# Patient Record
Sex: FEMALE | Employment: OTHER | URBAN - METROPOLITAN AREA
[De-identification: names, ages, dates, MRNs, and addresses within clinical notes are randomized per-mention and may not be internally consistent; named-entity substitution may affect disease eponyms.]

---

## 2023-03-01 ENCOUNTER — TELEPHONE (OUTPATIENT)
Dept: GERIATRICS | Facility: CLINIC | Age: 78
End: 2023-03-01
Payer: MEDICARE

## 2023-03-01 NOTE — TELEPHONE ENCOUNTER
[facility] Geriatrics Triage Nurse Telephone Encounter    Provider: BLANCA Valenzuela CNP   Facility: [facility] Facility Type:  TCU    Caller: Amber  Call Back Number: [phone]    Allergies:  Not on File     Reason for call: Nurse is reporting that patient hasn't voided during the day shift.  PVR was 158cc.  She says she when she tries to void that there is urine coming out, but nothing is actually coming out.  No abdominal pain or distention.      Verbal Order/Direction given by Provider: PVR Q 8 hours.  Straight cath for PVR >300cc or if patient c/o abdominal pain or distention.  Update on 3/2/23 if patient continue to have issues with urinary retention.      Provider giving Order:  BLANCA Valenzuela CNP     Verbal Order given to: Tom Marmolejo RN

## 2023-03-02 ENCOUNTER — DOCUMENTATION ONLY (OUTPATIENT)
Dept: GERIATRICS | Facility: CLINIC | Age: 78
End: 2023-03-02
Payer: MEDICARE

## 2023-03-02 ENCOUNTER — LAB REQUISITION (OUTPATIENT)
Dept: LAB | Facility: CLINIC | Age: 78
End: 2023-03-02

## 2023-03-02 ENCOUNTER — TELEPHONE (OUTPATIENT)
Dept: GERIATRICS | Facility: CLINIC | Age: 78
End: 2023-03-02
Payer: MEDICARE

## 2023-03-02 DIAGNOSIS — N17.9 ACUTE KIDNEY FAILURE, UNSPECIFIED (H): ICD-10-CM

## 2023-03-02 PROBLEM — I89.0 LYMPHEDEMA: Status: ACTIVE | Noted: 2021-04-06

## 2023-03-02 PROBLEM — E87.5 HYPERKALEMIA: Status: ACTIVE | Noted: 2022-09-01

## 2023-03-02 PROBLEM — N30.01 ACUTE CYSTITIS WITH HEMATURIA: Status: ACTIVE | Noted: 2023-02-12

## 2023-03-02 PROBLEM — A41.9 SEPSIS (H): Status: ACTIVE | Noted: 2023-02-21

## 2023-03-02 PROBLEM — R60.0 EDEMA OF BOTH LOWER EXTREMITIES: Status: ACTIVE | Noted: 2021-04-06

## 2023-03-02 PROBLEM — Z87.19 HISTORY OF GASTROINTESTINAL HEMORRHAGE: Status: ACTIVE | Noted: 2021-04-05

## 2023-03-02 PROBLEM — Z87.19 HISTORY OF SMALL BOWEL OBSTRUCTION: Status: ACTIVE | Noted: 2021-04-05

## 2023-03-02 PROBLEM — M75.51 BURSITIS OF RIGHT SHOULDER: Status: ACTIVE | Noted: 2020-09-11

## 2023-03-02 PROBLEM — S42.411D CLOSED SUPRACONDYLAR FRACTURE OF RIGHT HUMERUS WITH ROUTINE HEALING: Status: ACTIVE | Noted: 2022-02-21

## 2023-03-02 LAB
ANION GAP SERPL CALCULATED.3IONS-SCNC: 22 MMOL/L (ref 7–15)
BUN SERPL-MCNC: 70.6 MG/DL (ref 8–23)
CALCIUM SERPL-MCNC: 7 MG/DL (ref 8.8–10.2)
CHLORIDE SERPL-SCNC: 86 MMOL/L (ref 98–107)
CREAT SERPL-MCNC: 3.53 MG/DL (ref 0.51–0.95)
DEPRECATED HCO3 PLAS-SCNC: 20 MMOL/L (ref 22–29)
GFR SERPL CREATININE-BSD FRML MDRD: 13 ML/MIN/1.73M2
GLUCOSE SERPL-MCNC: 74 MG/DL (ref 70–99)
POTASSIUM SERPL-SCNC: 4.1 MMOL/L (ref 3.4–5.3)
SODIUM SERPL-SCNC: 128 MMOL/L (ref 136–145)

## 2023-03-02 PROCEDURE — 80048 BASIC METABOLIC PNL TOTAL CA: CPT | Performed by: FAMILY MEDICINE

## 2023-03-02 NOTE — TELEPHONE ENCOUNTER
[facility] Triage Nurse Telephone Encounter    Provider: BLANCA Valenzuela CNP   Facility: [facility] Facility Type:  TCU    Caller: Amber  Call Back Number: [phone]    Allergies:  Not on File     Reason for call: Nurse is calling to report BMP results.  BP's were in the 90's systolic earlier, but then came up to the low 100's.  Patient does endorse cramps in hands and feet.  Staff has been encouraging fluids.        Verbal Order/Direction given by Provider: Call and fax labs to nephrology.  Obtain a straight cath UA/UC.  Draw a Heme 1, CMP, Mg, TSH, B12, and pre-albumin on 3/3/23.      Provider giving Order:  BLANCA Valenzuela CNP     Verbal Order given to: Kimberly Marmolejo RN

## 2023-03-02 NOTE — PROGRESS NOTES
Nurse is calling to report patient with minimal bladder scan and minimal urine output, some muscle cramping and low blood pressures, recheck was 91/55.  Per hospital notes that she was recently hospitalized for urosepsis and completed antibiotics and Jung was removed.  She has only been in the TCU for about 24 hours.  Blood pressures in the hospital were in the low 100s over 70s however she did have poor p.o. intake per their I's a nd O's.  At this time she is otherwise stable and alert and oriented to her baseline, gave orders to check blood pressure every 4 hours, push fluids and call back for any confusion or any systolic blood pressure less than 90s.  Update primary in the morning.  Sherin Rebolledo, CNP

## 2023-03-03 ENCOUNTER — LAB REQUISITION (OUTPATIENT)
Dept: LAB | Facility: CLINIC | Age: 78
End: 2023-03-03

## 2023-03-03 ENCOUNTER — TRANSITIONAL CARE UNIT VISIT (OUTPATIENT)
Dept: GERIATRICS | Facility: CLINIC | Age: 78
End: 2023-03-03
Payer: MEDICARE

## 2023-03-03 VITALS
TEMPERATURE: 97.1 F | RESPIRATION RATE: 17 BRPM | HEART RATE: 101 BPM | DIASTOLIC BLOOD PRESSURE: 61 MMHG | SYSTOLIC BLOOD PRESSURE: 95 MMHG | OXYGEN SATURATION: 95 %

## 2023-03-03 DIAGNOSIS — Z71.89 ACP (ADVANCE CARE PLANNING): ICD-10-CM

## 2023-03-03 DIAGNOSIS — N30.01 ACUTE CYSTITIS WITH HEMATURIA: ICD-10-CM

## 2023-03-03 DIAGNOSIS — N17.9 ACUTE KIDNEY INJURY (H): ICD-10-CM

## 2023-03-03 DIAGNOSIS — K50.90 CROHN'S DISEASE WITHOUT COMPLICATION, UNSPECIFIED GASTROINTESTINAL TRACT LOCATION (H): ICD-10-CM

## 2023-03-03 DIAGNOSIS — G82.20 PARAPLEGIA (H): ICD-10-CM

## 2023-03-03 DIAGNOSIS — A41.9 BACTERIAL SEPSIS (H): Primary | ICD-10-CM

## 2023-03-03 DIAGNOSIS — N18.30 CHRONIC KIDNEY DISEASE, STAGE 3 UNSPECIFIED (H): ICD-10-CM

## 2023-03-03 DIAGNOSIS — Z93.2 ILEOSTOMY STATUS (H): ICD-10-CM

## 2023-03-03 DIAGNOSIS — N18.5 CKD (CHRONIC KIDNEY DISEASE) STAGE 5, GFR LESS THAN 15 ML/MIN (H): ICD-10-CM

## 2023-03-03 LAB
ALBUMIN SERPL BCG-MCNC: 3.4 G/DL (ref 3.5–5.2)
ALP SERPL-CCNC: 133 U/L (ref 35–104)
ALT SERPL W P-5'-P-CCNC: 16 U/L (ref 10–35)
ANION GAP SERPL CALCULATED.3IONS-SCNC: 24 MMOL/L (ref 7–15)
AST SERPL W P-5'-P-CCNC: 23 U/L (ref 10–35)
BASOPHILS # BLD AUTO: 0 10E3/UL (ref 0–0.2)
BASOPHILS NFR BLD AUTO: 1 %
BILIRUB SERPL-MCNC: 0.5 MG/DL
BUN SERPL-MCNC: 74.1 MG/DL (ref 8–23)
CALCIUM SERPL-MCNC: 6.6 MG/DL (ref 8.8–10.2)
CHLORIDE SERPL-SCNC: 83 MMOL/L (ref 98–107)
CREAT SERPL-MCNC: 3.62 MG/DL (ref 0.51–0.95)
DEPRECATED HCO3 PLAS-SCNC: 18 MMOL/L (ref 22–29)
EOSINOPHIL # BLD AUTO: 0 10E3/UL (ref 0–0.7)
EOSINOPHIL NFR BLD AUTO: 1 %
ERYTHROCYTE [DISTWIDTH] IN BLOOD BY AUTOMATED COUNT: 16.2 % (ref 10–15)
GFR SERPL CREATININE-BSD FRML MDRD: 12 ML/MIN/1.73M2
GLUCOSE SERPL-MCNC: 70 MG/DL (ref 70–99)
HCT VFR BLD AUTO: 27.4 % (ref 35–47)
HGB BLD-MCNC: 8.9 G/DL (ref 11.7–15.7)
IMM GRANULOCYTES # BLD: 0 10E3/UL
IMM GRANULOCYTES NFR BLD: 2 %
LYMPHOCYTES # BLD AUTO: 0.3 10E3/UL (ref 0.8–5.3)
LYMPHOCYTES NFR BLD AUTO: 12 %
MAGNESIUM SERPL-MCNC: 1.3 MG/DL (ref 1.7–2.3)
MCH RBC QN AUTO: 34.4 PG (ref 26.5–33)
MCHC RBC AUTO-ENTMCNC: 32.5 G/DL (ref 31.5–36.5)
MCV RBC AUTO: 106 FL (ref 78–100)
MONOCYTES # BLD AUTO: 0.5 10E3/UL (ref 0–1.3)
MONOCYTES NFR BLD AUTO: 18 %
NEUTROPHILS # BLD AUTO: 1.8 10E3/UL (ref 1.6–8.3)
NEUTROPHILS NFR BLD AUTO: 66 %
NRBC # BLD AUTO: 0 10E3/UL
NRBC BLD AUTO-RTO: 0 /100
PLATELET # BLD AUTO: 257 10E3/UL (ref 150–450)
POTASSIUM SERPL-SCNC: 4.1 MMOL/L (ref 3.4–5.3)
PREALB SERPL IA-MCNC: 29 MG/DL (ref 15–45)
PROT SERPL-MCNC: 5.6 G/DL (ref 6.4–8.3)
RBC # BLD AUTO: 2.59 10E6/UL (ref 3.8–5.2)
SODIUM SERPL-SCNC: 125 MMOL/L (ref 136–145)
TSH SERPL DL<=0.005 MIU/L-ACNC: 3.39 UIU/ML (ref 0.3–4.2)
VIT B12 SERPL-MCNC: >4000 PG/ML (ref 232–1245)
WBC # BLD AUTO: 2.7 10E3/UL (ref 4–11)

## 2023-03-03 PROCEDURE — 80053 COMPREHEN METABOLIC PANEL: CPT | Performed by: NURSE PRACTITIONER

## 2023-03-03 PROCEDURE — 84443 ASSAY THYROID STIM HORMONE: CPT | Performed by: NURSE PRACTITIONER

## 2023-03-03 PROCEDURE — 83735 ASSAY OF MAGNESIUM: CPT | Performed by: NURSE PRACTITIONER

## 2023-03-03 PROCEDURE — 84134 ASSAY OF PREALBUMIN: CPT | Performed by: NURSE PRACTITIONER

## 2023-03-03 PROCEDURE — 82607 VITAMIN B-12: CPT | Performed by: NURSE PRACTITIONER

## 2023-03-03 PROCEDURE — 99309 SBSQ NF CARE MODERATE MDM 30: CPT | Performed by: NURSE PRACTITIONER

## 2023-03-03 PROCEDURE — 85025 COMPLETE CBC W/AUTO DIFF WBC: CPT | Performed by: NURSE PRACTITIONER

## 2023-03-03 PROCEDURE — 99497 ADVNCD CARE PLAN 30 MIN: CPT | Performed by: NURSE PRACTITIONER

## 2023-03-03 RX ORDER — LEVOTHYROXINE SODIUM 50 UG/1
50 TABLET ORAL
COMMUNITY
Start: 2022-11-29

## 2023-03-03 RX ORDER — DICYCLOMINE HCL 20 MG
20 TABLET ORAL 3 TIMES DAILY
COMMUNITY
Start: 2022-04-20

## 2023-03-03 RX ORDER — ONDANSETRON 4 MG/1
4 TABLET, ORALLY DISINTEGRATING ORAL
COMMUNITY
Start: 2023-02-28

## 2023-03-03 RX ORDER — CALCIUM CARBONATE/VITAMIN D3 500 MG-10
1 TABLET,CHEWABLE ORAL EVERY MORNING
COMMUNITY
Start: 2023-02-28

## 2023-03-03 RX ORDER — MECLIZINE HYDROCHLORIDE 25 MG/1
25 TABLET ORAL
COMMUNITY
Start: 2022-08-02

## 2023-03-03 RX ORDER — MAGNESIUM OXIDE 400 MG/1
400 TABLET ORAL 2 TIMES DAILY
COMMUNITY
Start: 2023-02-28

## 2023-03-03 RX ORDER — CYANOCOBALAMIN 1000 UG/ML
1000 INJECTION, SOLUTION INTRAMUSCULAR; SUBCUTANEOUS EVERY MORNING
COMMUNITY
Start: 2022-10-19 | End: 2023-03-07

## 2023-03-03 RX ORDER — FAMOTIDINE 20 MG/1
20 TABLET, FILM COATED ORAL 2 TIMES DAILY
COMMUNITY
Start: 2022-06-01

## 2023-03-03 RX ORDER — ACETAMINOPHEN 500 MG
500 TABLET ORAL
COMMUNITY
Start: 2022-02-24

## 2023-03-03 RX ORDER — MULTIPLE VITAMINS W/ MINERALS TAB 9MG-400MCG
1 TAB ORAL
COMMUNITY

## 2023-03-03 NOTE — PROGRESS NOTES
[facility] GERIATRICS    PRIMARY CARE PROVIDER AND CLINIC:  Physician No Ref-Primary, No address on file  Chief Complaint   Patient presents with     Hospital F/U      [facility] Medical Record Number:  [id number]  Place of Service where encounter took place:  [facility]  AT [facility] ([facility]) [[id number]]    Rosalinda Schneider  is a 77 year old  (1945), admitted to the above facility from  [facility] . Hospital stay 02/21/2023 through 02/28/2023..   HPI:    Rosalinda Schneider is a 77 y.o. female with a history of paraplegia, ankylosing spondylitis-wheelchair-bound, Crohn's disease status post ileostomy and CKD admitted with AUGUSTINA on chronic kidney disease. Had previously been diagnosed with a Klebsiella UTI and was antibiotics. On admission, met sepsis criteria however repeat urine culture was negative, antibiotics Dc'd.  History of waxing and waning creatinine and presented with creatinine levels up to 6.7. Trended back down with IV fluids. Had been seen by nephrology during previous hospital stay. Referral placed for outpatient nephrology follow-up.  Also had urinary retention requiring Jung catheter placement. Jung catheter was removed prior to discharge.   1. Acute on chronic kidney injury ( ckd stage 3):   Patient has had waxing and waning creatinine.  Mostly has hovered between 1.8-3.  Presented with initial creatinine is 6.76.  Has trended down to patrick at 2.6.  2/27/23 up to 3.1.  Acute injury secondary to sepsis with ongoing infection, dehydration.   2. Sepsis  3. Ongoing urinary tract infection  - History of Klebsiella UTI during last admission.  -Was on Cipro on admission  -Switched to IV Rocephin.  - urine culture with no growth  - Stopped IV Rocephin  Other medical problems :   Paraplegia (HC) (12/23/2006)   Ankylosing spondylitis (HC) (5/9/2008)  - Wheelchair bound , mostly due to neck injury in 2002.    CROHN'S DISEASE (5/9/2008)   Status post ileostomy (HC) (10/19/2016)    Assessment: On imuran.  Ostomy placed due to perforated bowel. Functioning appropriately.  Hyponatremia (12/29/2012)    Assessment: Chronic Na ~130.    Patient seen today for follow up, appears healthy, good historian, no distress, intermittent hand/foot cramps, voiding well after thacker removed on 2/28, I&O appropriate, VS WNL, UA on 3/2 appears clean, labs on 3/3 in Epic, Na 125, Creat 3.62, Ca 6.6, GFR 12, Mg 1.3, TSH 3.39, B12 >4000, W 2.7, H 8.9, based on lab results is asymptomatic for any overt issues.    Advance Care Planning Goals of Care Discussion 3/3/2023  Goals of care discussed with Rosalinda Schneider on 3/3. Present at discussion: patient. Questions discussed and patient response:  What is your understanding now of where you are with your illness?: very good. How much information about what is likely to be ahead with your illness would you like to have?: everything. As the clinician I communicated the following to the patient regarding their prognosis: good. If your health situation worsens, what are your most important goals?: returning home. What are your biggest fears and worries about the future with your health?: kidney function. Unacceptable function : NA. What abilities are so critical to your life that you cannot imagine living without them?: returning home. Pt does NOT want to: die. If you become sicker, how much are you willing to go through for the possibility of gaining more time?: willing. Would this change if these were permanent states, if they did not get better?: maybe. How much does your agent and/or family know about your priorities and wishes?: most everything. Added by BLANCA Stern CNP              CODE STATUS/ADVANCE DIRECTIVES DISCUSSION:  No Order  CPR/Full code   ALLERGIES:   Allergies   Allergen Reactions     Ibuprofen Other (See Comments)     Other reaction(s): Renal Failure  All anti-inflamitory medications.  All anti-inflamitory medications.  All anti-inflamitory medications.  All  anti-inflamitory medications.  All anti-inflamitory medications.  All anti-inflamitory medications.  All anti-inflamitory medications.       Omeprazole Other (See Comments) and Swelling     Other reaction(s): Edema  Leg Swelling  Leg Swelling  Leg Swelling  Leg Swelling  Leg Swelling  Leg Swelling  Leg Swelling       Aspirin Itching     Other reaction(s): Itching  Other reaction(s): Itching       Naproxen      ALL ANTI-INFLAMMATORY MEDICATIONS  KIDNEY FAILURE  ALL ANTI-INFLAMMATORY MEDICATIONS  KIDNEY FAILURE       Rofecoxib Other (See Comments)     VIOXX- Renal function  problems  VIOXX- Renal function  problems       Sulfamethoxazole-Trimethoprim Other (See Comments)     Leg and hand cramps  Leg and hand cramps       Ciprofloxacin Rash     Other reaction(s): Unknown  Other reaction(s): Rash, Rash  Other reaction(s): Rash, Rash  Per patient only       Nsaids Unknown and Rash     Other reaction(s): *Unknown - Pt Doesn't Remember       Penicillins Dizziness and Rash     Other reaction(s): Rash  Other reaction(s): Rash  Per patient only        PAST MEDICAL HISTORY:   Past Medical History:   Diagnosis Date     Anemia      Chronic kidney disease      Crohn's disease (H)      Dyslipidemia      Gastroesophageal reflux disease with esophagitis      Small bowel obstruction (H)       PAST SURGICAL HISTORY:   has a past surgical history that includes Total Hip Arthroplasty; carpal tunnel release rt/lt; Colon surgery; joint replacement; REVISE TOTAL HIP REPLACEMENT; and cystoscopy.  FAMILY HISTORY: family history includes Cancer in her father.  SOCIAL HISTORY:   reports that she has never smoked. She has never been exposed to tobacco smoke. She has never used smokeless tobacco. She reports that she does not drink alcohol and does not use drugs.  Patient's living condition: lives with spouse    Post Discharge Medication Reconciliation Status:   MED REC REQUIRED  Post Medication Reconciliation Status: discharge medications  reconciled, continue medications without change       Current Outpatient Medications   Medication Sig     acetaminophen (TYLENOL) 500 MG tablet Take 500 mg by mouth     Ascorbic Acid 500 MG CHEW Take 500 mg by mouth every morning     Calcium Carb-Cholecalciferol 500-10 MG-MCG CHEW Take 1 tablet by mouth every morning     cholecalciferol 25 MCG (1000 UT) CHEW Take 1,000 Units by mouth     cyanocobalamin (CYANOCOBALAMIN) 1000 MCG/ML injection Inject 1,000 mcg into the muscle every morning     dicyclomine (BENTYL) 20 MG tablet Take 20 mg by mouth 3 times daily     famotidine (PEPCID) 20 MG tablet Take 20 mg by mouth 2 times daily     levothyroxine (SYNTHROID/LEVOTHROID) 50 MCG tablet Take 50 mcg by mouth     magnesium oxide (MAG-OX) 400 MG tablet Take 400 mg by mouth     meclizine (ANTIVERT) 25 MG tablet Take 25 mg by mouth     multivitamin w/minerals (THERA-VIT-M) tablet Take 1 tablet by mouth     ondansetron (ZOFRAN ODT) 4 MG ODT tab Place 4 mg under the tongue     No current facility-administered medications for this visit.       ROS:  4 point ROS including Respiratory, CV, GI and , other than that noted in the HPI,  is negative    Vitals:  BP 95/61   Pulse 101   Temp 97.1  F (36.2  C)   Resp 17   SpO2 95%   Exam:  GENERAL APPEARANCE:  in no distress, appears healthy  ENT:  Mouth and posterior oropharynx normal, moist mucous membranes  RESP:  lungs clear to auscultation   CV:  peripheral edema 1+ in lower legs, rate-normal  ABDOMEN:  bowel sounds normal  M/S:   Gait and station abnormal WC mobility  SKIN:  Inspection of skin and subcutaneous tissue baseline  NEURO:   Examination of sensation by touch normal  PSYCH:  affect and mood normal    Lab/Diagnostic data:  Labs done in SNF are in [facility] EPIC. Please refer to them using EPIC/Care Everywhere.    ASSESSMENT/PLAN:    (A41.9) Bacterial sepsis (H)  (primary encounter diagnosis)  (N30.01) Acute cystitis with hematuria  Comment: thought to have UTI in  hospital, completed ABX, UA on 3/2 clean, afebrile  Plan:   -PT/OT eval and treat  -dietary eval  -clarify B12 next injection 4/1/23  -UC pending results  -CBC, CMP, Mg, TSH, pre-alb and B12 completed, in [facility]  -plan to review lab results and make recommendations      (N17.9) Acute kidney injury (H)  (N18.5) CKD (chronic kidney disease) stage 5, GFR less than 15 ml/min (H)  Comment: creat high as 6.76 on 2/21, today 3.62  Plan: labs 3/3 in [facility]  -staff to fax labs to nephrology  -dose medications renally as possible      (G82.20) Paraplegia (H)  Comment: chronic, fall 2002, mild use LE's, moderate use UE's  Plan: follow up neurology PRN  -has EWC for mobility    (K50.90) Crohn's disease without complication, unspecified gastrointestinal tract location (H)  (Z93.2) Ileostomy status (H)  Comment: crohns 2008, iliostomy 2016  Plan: continue imuran BID  -follow up GI PRN    (Z71.89) ACP (advance care planning)  Comment: code status confirmed, full code  Plan: CT ADVANCE CARE PLANNING FIRST 30 MINS          I spent 17 minutes F2F with patient in addition to todays visit completing a POLST form.        Electronically signed by:  BLANCA Stern CNP

## 2023-03-03 NOTE — LETTER
3/3/2023        RE: Rosalinda Schneider  [address]  [place] [address]        [facility] GERIATRICS    PRIMARY CARE PROVIDER AND CLINIC:  Physician No Ref-Primary, No address on file  Chief Complaint   Patient presents with     Hospital F/U      [facility] Medical Record Number:  [id number]  Place of Service where encounter took place:  [facility]  AT [facility] ([facility]) [[id number]]    Rosalinda Schneider  is a 77 year old  (1945), admitted to the above facility from  [facility] . Hospital stay 02/21/2023 through 02/28/2023..   HPI:    Rosalinda Schneider is a 77 y.o. female with a history of paraplegia, ankylosing spondylitis-wheelchair-bound, Crohn's disease status post ileostomy and CKD admitted with AUGUSTINA on chronic kidney disease. Had previously been diagnosed with a Klebsiella UTI and was antibiotics. On admission, met sepsis criteria however repeat urine culture was negative, antibiotics Dc'd.  History of waxing and waning creatinine and presented with creatinine levels up to 6.7. Trended back down with IV fluids. Had been seen by nephrology during previous hospital stay. Referral placed for outpatient nephrology follow-up.  Also had urinary retention requiring Jung catheter placement. Jung catheter was removed prior to discharge.   1. Acute on chronic kidney injury ( ckd stage 3):   Patient has had waxing and waning creatinine.  Mostly has hovered between 1.8-3.  Presented with initial creatinine is 6.76.  Has trended down to patrick at 2.6.  2/27/23 up to 3.1.  Acute injury secondary to sepsis with ongoing infection, dehydration.   2. Sepsis  3. Ongoing urinary tract infection  - History of Klebsiella UTI during last admission.  -Was on Cipro on admission  -Switched to IV Rocephin.  - urine culture with no growth  - Stopped IV Rocephin  Other medical problems :   Paraplegia (HC) (12/23/2006)   Ankylosing spondylitis (HC) (5/9/2008)  - Wheelchair bound , mostly due to neck injury in 2002.    CROHN'S  DISEASE (5/9/2008)   Status post ileostomy (HC) (10/19/2016)    Assessment: On imuran. Ostomy placed due to perforated bowel. Functioning appropriately.  Hyponatremia (12/29/2012)    Assessment: Chronic Na ~130.    Patient seen today for follow up, appears healthy, good historian, no distress, intermittent hand/foot cramps, voiding well after thacker removed on 2/28, I&O appropriate, VS WNL, UA on 3/2 appears clean, labs on 3/3 in Epic, Na 125, Creat 3.62, Ca 6.6, GFR 12, Mg 1.3, TSH 3.39, B12 >4000, W 2.7, H 8.9, based on lab results is asymptomatic for any overt issues.    Advance Care Planning Goals of Care Discussion 3/3/2023  Goals of care discussed with Rosalinda Schneider on 3/3. Present at discussion: patient. Questions discussed and patient response:  What is your understanding now of where you are with your illness?: very good. How much information about what is likely to be ahead with your illness would you like to have?: everything. As the clinician I communicated the following to the patient regarding their prognosis: good. If your health situation worsens, what are your most important goals?: returning home. What are your biggest fears and worries about the future with your health?: kidney function. Unacceptable function : NA. What abilities are so critical to your life that you cannot imagine living without them?: returning home. Pt does NOT want to: die. If you become sicker, how much are you willing to go through for the possibility of gaining more time?: willing. Would this change if these were permanent states, if they did not get better?: maybe. How much does your agent and/or family know about your priorities and wishes?: most everything. Added by BLANCA Stern CNP              CODE STATUS/ADVANCE DIRECTIVES DISCUSSION:  No Order  CPR/Full code   ALLERGIES:   Allergies   Allergen Reactions     Ibuprofen Other (See Comments)     Other reaction(s): Renal Failure  All anti-inflamitory  medications.  All anti-inflamitory medications.  All anti-inflamitory medications.  All anti-inflamitory medications.  All anti-inflamitory medications.  All anti-inflamitory medications.  All anti-inflamitory medications.       Omeprazole Other (See Comments) and Swelling     Other reaction(s): Edema  Leg Swelling  Leg Swelling  Leg Swelling  Leg Swelling  Leg Swelling  Leg Swelling  Leg Swelling       Aspirin Itching     Other reaction(s): Itching  Other reaction(s): Itching       Naproxen      ALL ANTI-INFLAMMATORY MEDICATIONS  KIDNEY FAILURE  ALL ANTI-INFLAMMATORY MEDICATIONS  KIDNEY FAILURE       Rofecoxib Other (See Comments)     VIOXX- Renal function  problems  VIOXX- Renal function  problems       Sulfamethoxazole-Trimethoprim Other (See Comments)     Leg and hand cramps  Leg and hand cramps       Ciprofloxacin Rash     Other reaction(s): Unknown  Other reaction(s): Rash, Rash  Other reaction(s): Rash, Rash  Per patient only       Nsaids Unknown and Rash     Other reaction(s): *Unknown - Pt Doesn't Remember       Penicillins Dizziness and Rash     Other reaction(s): Rash  Other reaction(s): Rash  Per patient only        PAST MEDICAL HISTORY:   Past Medical History:   Diagnosis Date     Anemia      Chronic kidney disease      Crohn's disease (H)      Dyslipidemia      Gastroesophageal reflux disease with esophagitis      Small bowel obstruction (H)       PAST SURGICAL HISTORY:   has a past surgical history that includes Total Hip Arthroplasty; carpal tunnel release rt/lt; Colon surgery; joint replacement; REVISE TOTAL HIP REPLACEMENT; and cystoscopy.  FAMILY HISTORY: family history includes Cancer in her father.  SOCIAL HISTORY:   reports that she has never smoked. She has never been exposed to tobacco smoke. She has never used smokeless tobacco. She reports that she does not drink alcohol and does not use drugs.  Patient's living condition: lives with spouse    Post Discharge Medication Reconciliation  Status:   MED REC REQUIRED  Post Medication Reconciliation Status: discharge medications reconciled, continue medications without change       Current Outpatient Medications   Medication Sig     acetaminophen (TYLENOL) 500 MG tablet Take 500 mg by mouth     Ascorbic Acid 500 MG CHEW Take 500 mg by mouth every morning     Calcium Carb-Cholecalciferol 500-10 MG-MCG CHEW Take 1 tablet by mouth every morning     cholecalciferol 25 MCG (1000 UT) CHEW Take 1,000 Units by mouth     cyanocobalamin (CYANOCOBALAMIN) 1000 MCG/ML injection Inject 1,000 mcg into the muscle every morning     dicyclomine (BENTYL) 20 MG tablet Take 20 mg by mouth 3 times daily     famotidine (PEPCID) 20 MG tablet Take 20 mg by mouth 2 times daily     levothyroxine (SYNTHROID/LEVOTHROID) 50 MCG tablet Take 50 mcg by mouth     magnesium oxide (MAG-OX) 400 MG tablet Take 400 mg by mouth     meclizine (ANTIVERT) 25 MG tablet Take 25 mg by mouth     multivitamin w/minerals (THERA-VIT-M) tablet Take 1 tablet by mouth     ondansetron (ZOFRAN ODT) 4 MG ODT tab Place 4 mg under the tongue     No current facility-administered medications for this visit.       ROS:  4 point ROS including Respiratory, CV, GI and , other than that noted in the HPI,  is negative    Vitals:  BP 95/61   Pulse 101   Temp 97.1  F (36.2  C)   Resp 17   SpO2 95%   Exam:  GENERAL APPEARANCE:  in no distress, appears healthy  ENT:  Mouth and posterior oropharynx normal, moist mucous membranes  RESP:  lungs clear to auscultation   CV:  peripheral edema 1+ in lower legs, rate-normal  ABDOMEN:  bowel sounds normal  M/S:   Gait and station abnormal WC mobility  SKIN:  Inspection of skin and subcutaneous tissue baseline  NEURO:   Examination of sensation by touch normal  PSYCH:  affect and mood normal    Lab/Diagnostic data:  Labs done in SNF are in [facility]. Please refer to them using [facility]/Care Everywhere.    ASSESSMENT/PLAN:    (A41.9) Bacterial sepsis (H)  (primary encounter  diagnosis)  (N30.01) Acute cystitis with hematuria  Comment: thought to have UTI in hospital, completed ABX, UA on 3/2 clean, afebrile  Plan:   -PT/OT eval and treat  -dietary eval  -clarify B12 next injection 4/1/23  -UC pending results  -CBC, CMP, Mg, TSH, pre-alb and B12 completed, in [facility]  -plan to review lab results and make recommendations      (N17.9) Acute kidney injury (H)  (N18.5) CKD (chronic kidney disease) stage 5, GFR less than 15 ml/min (H)  Comment: creat high as 6.76 on 2/21, today 3.62  Plan: labs 3/3 in [facility]  -staff to fax labs to nephrology  -dose medications renally as possible      (G82.20) Paraplegia (H)  Comment: chronic, fall 2002, mild use LE's, moderate use UE's  Plan: follow up neurology PRN  -has EWC for mobility    (K50.90) Crohn's disease without complication, unspecified gastrointestinal tract location (H)  (Z93.2) Ileostomy status (H)  Comment: crohns 2008, iliostomy 2016  Plan: continue imuran BID  -follow up GI PRN    (Z71.89) ACP (advance care planning)  Comment: code status confirmed, full code  Plan: [place] ADVANCE CARE PLANNING FIRST 30 MINS          I spent 17 minutes F2F with patient in addition to todays visit completing a POLST form.        Electronically signed by:  BLANCA Stern CNP                     Sincerely,        BLANCA Stern CNP

## 2023-03-07 ENCOUNTER — TRANSITIONAL CARE UNIT VISIT (OUTPATIENT)
Dept: GERIATRICS | Facility: CLINIC | Age: 78
End: 2023-03-07
Payer: MEDICARE

## 2023-03-07 VITALS
RESPIRATION RATE: 16 BRPM | DIASTOLIC BLOOD PRESSURE: 65 MMHG | HEIGHT: 62 IN | BODY MASS INDEX: 38.18 KG/M2 | WEIGHT: 207.5 LBS | SYSTOLIC BLOOD PRESSURE: 103 MMHG | OXYGEN SATURATION: 97 % | HEART RATE: 98 BPM | TEMPERATURE: 96.4 F

## 2023-03-07 DIAGNOSIS — N17.9 ACUTE KIDNEY INJURY (H): ICD-10-CM

## 2023-03-07 DIAGNOSIS — E87.1 HYPONATREMIA: ICD-10-CM

## 2023-03-07 DIAGNOSIS — K50.90 CROHN'S DISEASE WITHOUT COMPLICATION, UNSPECIFIED GASTROINTESTINAL TRACT LOCATION (H): ICD-10-CM

## 2023-03-07 DIAGNOSIS — G82.20 PARAPLEGIA (H): ICD-10-CM

## 2023-03-07 DIAGNOSIS — Z93.2 ILEOSTOMY STATUS (H): Primary | ICD-10-CM

## 2023-03-07 DIAGNOSIS — I10 HYPERTENSION, UNSPECIFIED TYPE: ICD-10-CM

## 2023-03-07 PROCEDURE — 99309 SBSQ NF CARE MODERATE MDM 30: CPT | Performed by: NURSE PRACTITIONER

## 2023-03-07 RX ORDER — SODIUM CHLORIDE 1 G/1
1 TABLET ORAL 2 TIMES DAILY
Qty: 1 TABLET | Refills: 0
Start: 2023-03-07

## 2023-03-07 NOTE — PROGRESS NOTES
"[facility] GERIATRICS    Chief Complaint   Patient presents with     RECHECK     HPI:  Rosalinda Schneider is a 77 year old  (1945), who is being seen today for an episodic care visit at: [facility] AT [facility] ([facility]) [[id number]]. Today's concern is:   1. Ileostomy status (H)    2. Crohn's disease without complication, unspecified gastrointestinal tract location (H)    3. Paraplegia (H)    4. Acute kidney injury (H)    5. Hypertension, unspecified type    6. Hyponatremia      Patient seen for follow up, oriented, appears healthy, iliostomy output WNL, crohn's without gut issues, lower legs moderate plegia, WC bound, SBP's in the 160 range today, asymptomatic, noteable labs on 3/6:  Hgb 8.9  Na 125  Creat 3.62  GFR 12  Mg 1.3  TSH 3.39  B12 >4000    Allergies, and PMH/PSH reviewed in EPIC today.  REVIEW OF SYSTEMS:  4 point ROS including Respiratory, CV, GI and , other than that noted in the HPI,  is negative    Objective:   /65   Pulse 98   Temp (!) 96.4  F (35.8  C)   Resp 16   Ht 1.575 m (5' 2\")   Wt 94.1 kg (207 lb 8 oz)   SpO2 97%   BMI 37.95 kg/m    GENERAL APPEARANCE:  in no distress, appears healthy  ENT:  Mouth and posterior oropharynx normal, moist mucous membranes  RESP:  lungs clear to auscultation   CV:  rate-normal  ABDOMEN:  bowel sounds normal  M/S:   Gait and station abnormal WC mobility  SKIN:  Inspection of skin and subcutaneous tissue baseline  NEURO:   Examination of sensation by touch normal  PSYCH:  affect and mood normal    Labs done in SNF are in [facility]. Please refer to them using [facility]/Care Everywhere.    Assessment/Plan:  (Z93.2) Ileostomy status (H)  (primary encounter diagnosis)  Comment: appliance intact, output WNL  Plan: change bi-weekly, monitor output    (K50.90) Crohn's disease without complication, unspecified gastrointestinal tract location (H)  Comment: denies gut issues  Plan: continue azathioprine 50mg BID  -follow up GI PRN    (G82.20) Paraplegia " (H)  Comment: chronic post neck injury  Plan: staff to support as indicated  -PT.OT working with    (N17.9) Acute kidney injury (H)  Comment: labs 3/6 as above  Plan: dose medications renally as possible  -follow up nephrology as scheduled    (I10) Hypertension, unspecified type  Comment: SBP's elevated in the 160 range  Plan: start losartan 25mg  -staff to check VS daily    (E87.1) Hyponatremia  Comment: Na 125, Mg 1.3  Plan:  -start sodium chloride 1g BID  -increase Mg to BID  -BMP, Mg on 3/9    MED REC REQUIRED  Post Medication Reconciliation Status: medication reconcilation previously completed during another office visit          Electronically signed by: BLANCA Stern CNP

## 2023-03-07 NOTE — LETTER
"    3/7/2023        RE: Rosalinda Schneider  [address]  [place] [address]        M [facility] GERIATRICS    Chief Complaint   Patient presents with     RECHECK     HPI:  Rosalinda Schneider is a 77 year old  (1945), who is being seen today for an episodic care visit at: [facility] ([facility]) [[id number]]. Today's concern is:   1. Ileostomy status (H)    2. Crohn's disease without complication, unspecified gastrointestinal tract location (H)    3. Paraplegia (H)    4. Acute kidney injury (H)    5. Hypertension, unspecified type    6. Hyponatremia      Patient seen for follow up, oriented, appears healthy, iliostomy output WNL, crohn's without gut issues, lower legs moderate plegia, WC bound, SBP's in the 160 range today, asymptomatic, noteable labs on 3/6:  Hgb 8.9  Na 125  Creat 3.62  GFR 12  Mg 1.3  TSH 3.39  B12 >4000    Allergies, and PMH/PSH reviewed in EPIC today.  REVIEW OF SYSTEMS:  4 point ROS including Respiratory, CV, GI and , other than that noted in the HPI,  is negative    Objective:   /65   Pulse 98   Temp (!) 96.4  F (35.8  C)   Resp 16   Ht 1.575 m (5' 2\")   Wt 94.1 kg (207 lb 8 oz)   SpO2 97%   BMI 37.95 kg/m    GENERAL APPEARANCE:  in no distress, appears healthy  ENT:  Mouth and posterior oropharynx normal, moist mucous membranes  RESP:  lungs clear to auscultation   CV:  rate-normal  ABDOMEN:  bowel sounds normal  M/S:   Gait and station abnormal WC mobility  SKIN:  Inspection of skin and subcutaneous tissue baseline  NEURO:   Examination of sensation by touch normal  PSYCH:  affect and mood normal    Labs done in SNF are in [facility]. Please refer to them using EPIC/Care Everywhere.    Assessment/Plan:  (Z93.2) Ileostomy status (H)  (primary encounter diagnosis)  Comment: appliance intact, output WNL  Plan: change bi-weekly, monitor output    (K50.90) Crohn's disease without complication, unspecified gastrointestinal tract location (H)  Comment: denies gut " issues  Plan: continue azathioprine 50mg BID  -follow up GI PRN    (G82.20) Paraplegia (H)  Comment: chronic post neck injury  Plan: staff to support as indicated  -PT.OT working with    (N17.9) Acute kidney injury (H)  Comment: labs 3/6 as above  Plan: dose medications renally as possible  -follow up nephrology as scheduled    (I10) Hypertension, unspecified type  Comment: SBP's elevated in the 160 range  Plan: start losartan 25mg  -staff to check VS daily    (E87.1) Hyponatremia  Comment: Na 125, Mg 1.3  Plan:  -start sodium chloride 1g BID  -increase Mg to BID  -BMP, Mg on 3/9    MED REC REQUIRED  Post Medication Reconciliation Status: medication reconcilation previously completed during another office visit          Electronically signed by: BLANCA Stern CNP           Sincerely,        BLANCA Stern CNP

## 2023-03-08 ENCOUNTER — TELEPHONE (OUTPATIENT)
Dept: GERIATRICS | Facility: CLINIC | Age: 78
End: 2023-03-08
Payer: MEDICARE

## 2023-03-09 NOTE — TELEPHONE ENCOUNTER
Rosalinda Schneider is a 77 year old  (1945), Nurse called today to report: Very low blood pressures.  Per nursing systolic blood pressures 40s to 50s nursing reports she checked the patient manually.  patient was alert and oriented during this time per nursing and not lethargic.  She is currently getting normal saline and nurse reports her blood pressure is dropping instead of coming out the phone call was quick nurse and wanted to get back to the patient.  She was sent to the ER for further evaluation       Sent to ED:YES     Electronically signed by:   Sarah Adames CNP